# Patient Record
Sex: MALE | Race: WHITE
[De-identification: names, ages, dates, MRNs, and addresses within clinical notes are randomized per-mention and may not be internally consistent; named-entity substitution may affect disease eponyms.]

---

## 2020-01-27 ENCOUNTER — HOSPITAL ENCOUNTER (OUTPATIENT)
Dept: HOSPITAL 19 - SDCO | Age: 45
Discharge: HOME | End: 2020-01-27
Attending: INTERNAL MEDICINE
Payer: OTHER GOVERNMENT

## 2020-01-27 VITALS — DIASTOLIC BLOOD PRESSURE: 84 MMHG | HEART RATE: 57 BPM | SYSTOLIC BLOOD PRESSURE: 113 MMHG

## 2020-01-27 VITALS — HEART RATE: 62 BPM | TEMPERATURE: 97 F | DIASTOLIC BLOOD PRESSURE: 89 MMHG | SYSTOLIC BLOOD PRESSURE: 124 MMHG

## 2020-01-27 VITALS — SYSTOLIC BLOOD PRESSURE: 113 MMHG | HEART RATE: 63 BPM | DIASTOLIC BLOOD PRESSURE: 82 MMHG

## 2020-01-27 VITALS — DIASTOLIC BLOOD PRESSURE: 76 MMHG | HEART RATE: 71 BPM | SYSTOLIC BLOOD PRESSURE: 107 MMHG | TEMPERATURE: 97.4 F

## 2020-01-27 VITALS — BODY MASS INDEX: 31.77 KG/M2 | WEIGHT: 247.58 LBS | HEIGHT: 74 IN

## 2020-01-27 DIAGNOSIS — R14.0: ICD-10-CM

## 2020-01-27 DIAGNOSIS — R10.13: ICD-10-CM

## 2020-01-27 DIAGNOSIS — K29.60: Primary | ICD-10-CM

## 2020-01-27 DIAGNOSIS — R14.2: ICD-10-CM

## 2020-01-27 DIAGNOSIS — R07.9: ICD-10-CM

## 2020-01-27 NOTE — NUR
Pt to [facility] 4 via cart from [facility]. Pt awake and alert. Pt ambulates to
recliner with stand by assistance. Warm blankets provided. Wife in room.
Muffin and juice given per pt request. Will continue to monitor. Call light
within reach.

## 2022-12-23 ENCOUNTER — HOSPITAL ENCOUNTER (EMERGENCY)
Dept: HOSPITAL 11 - JP.ED | Age: 47
Discharge: LEFT BEFORE DISCHARGE | End: 2022-12-23
Payer: OTHER GOVERNMENT

## 2022-12-23 DIAGNOSIS — Z53.21: Primary | ICD-10-CM
